# Patient Record
Sex: FEMALE | URBAN - METROPOLITAN AREA
[De-identification: names, ages, dates, MRNs, and addresses within clinical notes are randomized per-mention and may not be internally consistent; named-entity substitution may affect disease eponyms.]

---

## 2022-05-03 ENCOUNTER — APPOINTMENT (RX ONLY)
Dept: URBAN - METROPOLITAN AREA CLINIC 108 | Facility: CLINIC | Age: 47
Setting detail: DERMATOLOGY
End: 2022-05-03

## 2022-05-03 DIAGNOSIS — L82.1 OTHER SEBORRHEIC KERATOSIS: ICD-10-CM

## 2022-05-03 DIAGNOSIS — L81.1 CHLOASMA: ICD-10-CM | Status: INADEQUATELY CONTROLLED

## 2022-05-03 PROCEDURE — ? FULL BODY SKIN EXAM - DECLINED

## 2022-05-03 PROCEDURE — ? PRESCRIPTION MEDICATION MANAGEMENT

## 2022-05-03 PROCEDURE — ? ADDITIONAL NOTES

## 2022-05-03 PROCEDURE — ? COUNSELING

## 2022-05-03 PROCEDURE — ? TREATMENT REGIMEN

## 2022-05-03 PROCEDURE — ? PRESCRIPTION

## 2022-05-03 PROCEDURE — 99204 OFFICE O/P NEW MOD 45 MIN: CPT

## 2022-05-03 RX ORDER — PHARMACY COMPOUNDING ACCESSORY
EACH MISCELLANEOUS
Qty: 30 | Refills: 2 | Status: ERX | COMMUNITY
Start: 2022-05-03

## 2022-05-03 RX ADMIN — Medication: at 00:00

## 2022-05-03 ASSESSMENT — LOCATION DETAILED DESCRIPTION DERM
LOCATION DETAILED: LEFT DISTAL PRETIBIAL REGION
LOCATION DETAILED: RIGHT INFERIOR CENTRAL MALAR CHEEK
LOCATION DETAILED: LEFT INFERIOR CENTRAL MALAR CHEEK
LOCATION DETAILED: RIGHT CENTRAL MALAR CHEEK
LOCATION DETAILED: RIGHT CENTRAL ZYGOMA

## 2022-05-03 ASSESSMENT — LOCATION SIMPLE DESCRIPTION DERM
LOCATION SIMPLE: RIGHT CHEEK
LOCATION SIMPLE: LEFT PRETIBIAL REGION
LOCATION SIMPLE: LEFT CHEEK
LOCATION SIMPLE: RIGHT ZYGOMA

## 2022-05-03 ASSESSMENT — LOCATION ZONE DERM
LOCATION ZONE: LEG
LOCATION ZONE: FACE

## 2022-05-03 NOTE — PROCEDURE: ADDITIONAL NOTES
Render Risk Assessment In Note?: yes
Additional Notes: Discussed elective cosmetic treatment of SKs, $125 for individual lesion - pt will consider, can schedule at convenience.
Detail Level: Detailed

## 2022-05-03 NOTE — PROCEDURE: TREATMENT REGIMEN
Detail Level: Generalized
Plan: Patient has been using an OTC HQ containing product for >3 months on the b/l cheeks - discussed risk of exogenous ochronosis, advised to avoid HQ containing products for at least 1-2 months. \\nDiscussed options of topical treatments vs chemical peel for hyperpigmentation. Pt prefers to trial topical and consider chemical peel at later date. \\n\\nStart compounded ascorb 1%/ Hydroc 2.5% / tret 0.025% / niacinamide 4% qHS, sunscreen daily. \\n\\nRTC 2 mo